# Patient Record
Sex: FEMALE | Race: WHITE | URBAN - METROPOLITAN AREA
[De-identification: names, ages, dates, MRNs, and addresses within clinical notes are randomized per-mention and may not be internally consistent; named-entity substitution may affect disease eponyms.]

---

## 2021-08-28 ENCOUNTER — TELEPHONE ENCOUNTER (OUTPATIENT)
Dept: URBAN - METROPOLITAN AREA CLINIC 13 | Facility: CLINIC | Age: 61
End: 2021-08-28

## 2021-08-29 ENCOUNTER — TELEPHONE ENCOUNTER (OUTPATIENT)
Dept: URBAN - METROPOLITAN AREA CLINIC 13 | Facility: CLINIC | Age: 61
End: 2021-08-29

## 2024-07-29 ENCOUNTER — OFFICE VISIT (OUTPATIENT)
Dept: URBAN - METROPOLITAN AREA CLINIC 86 | Facility: CLINIC | Age: 64
End: 2024-07-29

## 2024-08-07 ENCOUNTER — OFFICE VISIT (OUTPATIENT)
Dept: URBAN - METROPOLITAN AREA CLINIC 86 | Facility: CLINIC | Age: 64
End: 2024-08-07
Payer: COMMERCIAL

## 2024-08-07 VITALS
BODY MASS INDEX: 29.19 KG/M2 | SYSTOLIC BLOOD PRESSURE: 166 MMHG | TEMPERATURE: 96.6 F | WEIGHT: 171 LBS | DIASTOLIC BLOOD PRESSURE: 94 MMHG | HEART RATE: 73 BPM | HEIGHT: 64 IN

## 2024-08-07 DIAGNOSIS — Z71.85 VACCINE COUNSELING: ICD-10-CM

## 2024-08-07 DIAGNOSIS — K76.9 LIVER LESION: ICD-10-CM

## 2024-08-07 PROBLEM — 707724006: Status: ACTIVE | Noted: 2024-08-07

## 2024-08-07 PROBLEM — 443913008: Status: ACTIVE | Noted: 2024-08-07

## 2024-08-07 PROBLEM — 300331000: Status: ACTIVE | Noted: 2024-08-07

## 2024-08-07 PROBLEM — 151271000119102: Status: ACTIVE | Noted: 2024-08-07

## 2024-08-07 PROCEDURE — 99214 OFFICE O/P EST MOD 30 MIN: CPT | Performed by: PHYSICIAN ASSISTANT

## 2024-08-07 RX ORDER — MONTELUKAST SODIUM 10 MG/1
1 TABLET TABLET, FILM COATED ORAL ONCE A DAY
Status: ACTIVE | COMMUNITY

## 2024-08-07 NOTE — HPI-TODAY'S VISIT:
This is a 63-year-old female, referred by Dr. Smith, for evaluation of elevated liver enzymes and positive AMA. A copy of the note will be sent to the referring provider.   PMH includes allergies, asthma, hx hip replacements.  She is listed as taking azelastine 137 mcg, montelukast 10 mg, hydroxyzine 25 mg.  Labs reviewed in July 17 2024 labs with MARIA L positive, iron studies were normal.  Bilirubin 0.5, alkaline phosphatase 442, AST 69, ALT 84.  Immunoglobulin G553 and IgM normal.  .  Ceruloplasmin 43, alpha 1 antitrypsin level 178, ASMA was negative. SHe was able to find labs on shai from 2019 and at that time she had alp 258, ast 35, alt 55 tb 0.5  2020 labs with alp 261, ast 38, alt 39 tb 0.2  2021 alp 335 ast 34 alt 39 0.4  2023 alp 425 ast 40 alt 75  June 2024 labs with alp 502, ast 45, alt 57  CT scan done 9n 8/1/24 showing 1.7 cm enhancing lesion. suspected hemangioma but said to get MRI to be sure  She notees that her liver enzymes have been elevated for some time. She has had this for the past 4-5 yeras. Not sure of the elevation.   She has taken milk thistle. Not currently. She avoids NSAIDS. she said she took marshmellow root She takes a daily MVI and that is all. She used to take calcium.  She takes singulair  hydroxyzine 10 mg  Denies recent illness or illness around the time of the labs and the antibiotics   She has MRI scheduled next week.

## 2024-08-13 ENCOUNTER — TELEPHONE ENCOUNTER (OUTPATIENT)
Dept: URBAN - METROPOLITAN AREA CLINIC 86 | Facility: CLINIC | Age: 64
End: 2024-08-13

## 2024-08-13 LAB
A/G RATIO: 1.5
ABSOLUTE BASOPHILS: 67
ABSOLUTE EOSINOPHILS: 385
ABSOLUTE LYMPHOCYTES: 2227
ABSOLUTE MONOCYTES: 511
ABSOLUTE NEUTROPHILS: 4211
ALBUMIN: 4.6
ALKALINE PHOSPHATASE: 630
ALKALINE PHOSPHATASE: 643
ALPHA-1-ANTITRYPSIN (AAT) PHENOTYPE: (no result)
ALT (SGPT): 100
AST (SGOT): 55
BASOPHILS: 0.9
BILIRUBIN, TOTAL: 0.6
BONE ISOENZYMES: 24
BUN/CREATININE RATIO: 43
BUN: 19
CALCIUM: 10.2
CARBON DIOXIDE, TOTAL: 22
CHLORIDE: 103
CREATININE: 0.44
EGFR: 110
EOSINOPHILS: 5.2
GLOBULIN, TOTAL: 3.1
GLUCOSE: 103
HEMATOCRIT: 42.8
HEMOGLOBIN: 14.5
HEPATITIS A AB, TOTAL: REACTIVE
HEPATITIS B SURFACE AB IMMUNITY, QN: <5
IMMUNOGLOBULIN A: 103
IMMUNOGLOBULIN G: 677
IMMUNOGLOBULIN M: 1069
INR: 0.9
INTESTINAL ISOENZYMES: 0
LIVER ISOENZYMES: 76
LYMPHOCYTES: 30.1
MACROHEPATIC ISOENZYMES: 0
MCH: 31.8
MCHC: 33.9
MCV: 93.9
MONOCYTES: 6.9
MPV: 11
NEUTROPHILS: 56.9
PLACENTAL ISOENZYMES: 0
PLATELET COUNT: 325
POTASSIUM: 4.7
PROTEIN, TOTAL: 7.7
PT: 9.8
RDW: 12.6
RED BLOOD CELL COUNT: 4.56
SODIUM: 140
WHITE BLOOD CELL COUNT: 7.4

## 2024-08-13 NOTE — HPI-TODAY'S VISIT:
Dear Wandy Foster,   The recent 8/7/24 labs were sent to me.  The immunoglobulin testing showed that the immunoglobulin M was elevated at 1069, IgG 677 this is normal.  You are not immune to hepatitis B and I recommend discussing that vaccine with your primary care.  Alpha 1 antitrypsin phenotype is MM and this is normal.  The alkaline phosphatase is elevated at 643, AST 55, , bilirubin 0.6.  Goal for the AST and ALT is less than 35.  The white blood cell 7.4, hemoglobin 14.5, MCV 93 and platelets 325.  INR 0.9.  You are immune to hepatitis A.  When I fractionated the alkaline phosphatase that showed that it is mainly coming from the liver and therefore given the labs it does appear that your immune system is active and we  need to see the MRI and pending that likely a biopsy. Have you done the MRI? Please let me know and I will get my medical assistant to get the report.  Sarah Cruz PA-C

## 2024-08-14 ENCOUNTER — TELEPHONE ENCOUNTER (OUTPATIENT)
Dept: URBAN - METROPOLITAN AREA CLINIC 86 | Facility: CLINIC | Age: 64
End: 2024-08-14

## 2024-08-14 ENCOUNTER — WEB ENCOUNTER (OUTPATIENT)
Dept: URBAN - METROPOLITAN AREA CLINIC 86 | Facility: CLINIC | Age: 64
End: 2024-08-14

## 2024-08-14 NOTE — HPI-TODAY'S VISIT:
Dear Wandy Foster,   The 8/13/24 MRI was sent to me.  The liver without fat or iron.  They saw a small 1.9 x 2 cm area that is consistent with a hemangioma in the left hepatic lobe. These are benign.  They did not see any gallstones they saw mild intrahepatic biliary ductal dilatation and mild dilation of the common bile duct measuring up to 12 mm and I did not see any stone.  Spleen unremarkable.  They saw some several lymph nodes that were enlarged.  They noted that the pancreatic duct was mildly dilated at 4 mm.  Several lymph nodes were mildly enlarged for example gastrohepatic nodes measuring 3.2 x 1.6 cm and periportal nodes 3.5 x 3 cm and aortocaval nodes 1.8 x 1.2 cm.  Overall they saw abnormal retroperitoneal lymphadenopathy they noted that malignancy like lymphoma or metastatic disease could not be excluded they also noted mild intrahepatic and extrahepatic biliary ductal dilatation and mildly dilated pancreatic duct they did not see any stones.  They recommend correlation with ERCP and labs.  May be good to repeat the labs once you are feeling better to see if they are improved. Curious if your illness had an affect on the lymph nodes? Eitherway we will likely need the ERCP. I will go ahead and refer to our team that performs these.  Sarah Cruz PA-C

## 2024-08-15 ENCOUNTER — TELEPHONE ENCOUNTER (OUTPATIENT)
Dept: URBAN - METROPOLITAN AREA CLINIC 86 | Facility: CLINIC | Age: 64
End: 2024-08-15

## 2024-08-15 ENCOUNTER — WEB ENCOUNTER (OUTPATIENT)
Dept: URBAN - METROPOLITAN AREA CLINIC 86 | Facility: CLINIC | Age: 64
End: 2024-08-15

## 2024-08-21 ENCOUNTER — OFFICE VISIT (OUTPATIENT)
Dept: URBAN - METROPOLITAN AREA TELEHEALTH 2 | Facility: TELEHEALTH | Age: 64
End: 2024-08-21
Payer: COMMERCIAL

## 2024-08-21 ENCOUNTER — LAB OUTSIDE AN ENCOUNTER (OUTPATIENT)
Dept: URBAN - METROPOLITAN AREA TELEHEALTH 2 | Facility: TELEHEALTH | Age: 64
End: 2024-08-21

## 2024-08-21 DIAGNOSIS — K83.8 COMMON BILE DUCT DILATATION: ICD-10-CM

## 2024-08-21 DIAGNOSIS — K76.9 LIVER LESION: ICD-10-CM

## 2024-08-21 DIAGNOSIS — R74.8 ELEVATED LIVER ENZYMES: ICD-10-CM

## 2024-08-21 DIAGNOSIS — R76.9 ABNORMAL IMMUNOLOGICAL FINDING IN SERUM: ICD-10-CM

## 2024-08-21 PROBLEM — 123608004: Status: ACTIVE | Noted: 2024-08-21

## 2024-08-21 PROCEDURE — 99214 OFFICE O/P EST MOD 30 MIN: CPT | Performed by: PHYSICIAN ASSISTANT

## 2024-08-21 RX ORDER — MONTELUKAST SODIUM 10 MG/1
1 TABLET TABLET, FILM COATED ORAL ONCE A DAY
Status: ACTIVE | COMMUNITY

## 2024-08-21 NOTE — HPI-TODAY'S VISIT:
This is a 63-year-old female, referred by Dr. Smith, for evaluation of elevated liver enzymes and positive AMA. A copy of the note will be sent to the referring provider.    Dear Wandy Foster,   The recent 8/7/24 labs were sent to me.  The immunoglobulin testing showed that the immunoglobulin M was elevated at 1069, IgG 677 this is normal.  You are not immune to hepatitis B and I recommend discussing that vaccine with your primary care.  Alpha 1 antitrypsin phenotype is MM and this is normal.  The alkaline phosphatase is elevated at 643, AST 55, , bilirubin 0.6.  Goal for the AST and ALT is less than 35.  The white blood cell 7.4, hemoglobin 14.5, MCV 93 and platelets 325.  INR 0.9.  You are immune to hepatitis A.  When I fractionated the alkaline phosphatase that showed that it is mainly coming from the liver and therefore given the labs it does appear that your immune system is active and we  need to see the MRI and pending that likely a biopsy. Have you done the MRI? Please let me know and I will get my medical assistant to get the report.  Sarah Cruz PA-C  Dear Wandy Foster,   The 8/13/24 MRI was sent to me.  The liver without fat or iron.  They saw a small 1.9 x 2 cm area that is consistent with a hemangioma in the left hepatic lobe. These are benign.  They did not see any gallstones they saw mild intrahepatic biliary ductal dilatation and mild dilation of the common bile duct measuring up to 12 mm and I did not see any stone.  Spleen unremarkable.  They saw some several lymph nodes that were enlarged.  They noted that the pancreatic duct was mildly dilated at 4 mm.  Several lymph nodes were mildly enlarged for example gastrohepatic nodes measuring 3.2 x 1.6 cm and periportal nodes 3.5 x 3 cm and aortocaval nodes 1.8 x 1.2 cm.  Overall they saw abnormal retroperitoneal lymphadenopathy they noted that malignancy like lymphoma or metastatic disease could not be excluded they also noted mild intrahepatic and extrahepatic biliary ductal dilatation and mildly dilated pancreatic duct they did not see any stones.  They recommend correlation with ERCP and labs.  May be good to repeat the labs once you are feeling better to see if they are improved. Curious if your illness had an affect on the lymph nodes? Eitherway we will likely need the ERCP. I will go ahead and refer to our team that performs these.  Sarah Cruz PA-C    she was really sick and saw pcp had rocephin and given zpack but feeling better this was right after she saw me and after mri she is scheduled for ercp consult still think given 5 + year of elevation and the labs still being abmormal need the bx. ERCP is not going to give us the diagnosis  recap  PMH includes allergies, asthma, hx hip replacements.  She is listed as taking azelastine 137 mcg, montelukast 10 mg, hydroxyzine 25 mg.  Labs reviewed in July 17 2024 labs with MARIA L positive, iron studies were normal.  Bilirubin 0.5, alkaline phosphatase 442, AST 69, ALT 84.  Immunoglobulin G553 and IgM normal.  .  Ceruloplasmin 43, alpha 1 antitrypsin level 178, ASMA was negative. SHe was able to find labs on shai from 2019 and at that time she had alp 258, ast 35, alt 55 tb 0.5  2020 labs with alp 261, ast 38, alt 39 tb 0.2  2021 alp 335 ast 34 alt 39 0.4  2023 alp 425 ast 40 alt 75  June 2024 labs with alp 502, ast 45, alt 57  CT scan done 9n 8/1/24 showing 1.7 cm enhancing lesion. suspected hemangioma but said to get MRI to be sure  She notees that her liver enzymes have been elevated for some time. She has had this for the past 4-5 yeras. Not sure of the elevation.   She has taken milk thistle. Not currently. She avoids NSAIDS. she said she took marshmellow root She takes a daily MVI and that is all. She used to take calcium.  She takes singulair  hydroxyzine 10 mg  Denies recent illness or illness around the time of the labs and the antibiotics   She has MRI scheduled next week.

## 2024-08-23 ENCOUNTER — DASHBOARD ENCOUNTERS (OUTPATIENT)
Age: 64
End: 2024-08-23

## 2024-08-26 ENCOUNTER — TELEPHONE ENCOUNTER (OUTPATIENT)
Dept: URBAN - METROPOLITAN AREA CLINIC 92 | Facility: CLINIC | Age: 64
End: 2024-08-26

## 2024-08-27 ENCOUNTER — OFFICE VISIT (OUTPATIENT)
Dept: URBAN - METROPOLITAN AREA CLINIC 92 | Facility: CLINIC | Age: 64
End: 2024-08-27

## 2024-08-27 RX ORDER — MONTELUKAST SODIUM 10 MG/1
1 TABLET TABLET, FILM COATED ORAL ONCE A DAY
Status: ACTIVE | COMMUNITY

## 2024-08-28 ENCOUNTER — WEB ENCOUNTER (OUTPATIENT)
Dept: URBAN - METROPOLITAN AREA CLINIC 86 | Facility: CLINIC | Age: 64
End: 2024-08-28

## 2024-08-30 ENCOUNTER — WEB ENCOUNTER (OUTPATIENT)
Dept: URBAN - METROPOLITAN AREA CLINIC 86 | Facility: CLINIC | Age: 64
End: 2024-08-30

## 2024-09-02 ENCOUNTER — WEB ENCOUNTER (OUTPATIENT)
Dept: URBAN - METROPOLITAN AREA CLINIC 86 | Facility: CLINIC | Age: 64
End: 2024-09-02

## 2024-09-04 ENCOUNTER — WEB ENCOUNTER (OUTPATIENT)
Dept: URBAN - METROPOLITAN AREA CLINIC 86 | Facility: CLINIC | Age: 64
End: 2024-09-04

## 2024-09-11 ENCOUNTER — OFFICE VISIT (OUTPATIENT)
Dept: URBAN - METROPOLITAN AREA TELEHEALTH 2 | Facility: TELEHEALTH | Age: 64
End: 2024-09-11
Payer: COMMERCIAL

## 2024-09-11 ENCOUNTER — TELEPHONE ENCOUNTER (OUTPATIENT)
Dept: URBAN - METROPOLITAN AREA CLINIC 92 | Facility: CLINIC | Age: 64
End: 2024-09-11

## 2024-09-11 VITALS — HEIGHT: 64 IN | WEIGHT: 180 LBS | BODY MASS INDEX: 30.73 KG/M2

## 2024-09-11 DIAGNOSIS — K83.8 COMMON BILE DUCT DILATATION: ICD-10-CM

## 2024-09-11 PROCEDURE — 99213 OFFICE O/P EST LOW 20 MIN: CPT

## 2024-09-11 RX ORDER — MONTELUKAST SODIUM 10 MG/1
1 TABLET TABLET, FILM COATED ORAL ONCE A DAY
Status: ACTIVE | COMMUNITY

## 2024-09-11 NOTE — HPI-TODAY'S VISIT:
63-year-old female presents today for common bile duct dilation.  She sees liver center.  She had an MRI on 8- which demonstrated abnormal upper abdominal and retroperitoneal lymphadenopathy malignancy such as lymphoma or metastatic disease not excluded, mild intrahepatic and extrahepatic biliary ductal dilation and mildly dilated pancreatic ducts.  No choledocholithiasis or obstructing mass identified recommend correlation with liver function test and consider ERCP, benign hemangioma in the left hepatic lobe.  Her AMA and MARIA L was positive and they are ruling out PBC going to obtain a liver biopsy.  Labs 8- demonstrated , AST 55,  ALP isoenzymes and liver were elevated at 76, INR 0.9, platelets 325.  She has no abd pain,n/v/f/c. She has normal BM daily. Last colon was 9 years ago. She is due next year for repeat colon. She has no fam h/o gi cancer or conditions.

## 2024-09-16 ENCOUNTER — WEB ENCOUNTER (OUTPATIENT)
Dept: URBAN - METROPOLITAN AREA CLINIC 92 | Facility: CLINIC | Age: 64
End: 2024-09-16

## 2024-09-23 ENCOUNTER — OFFICE VISIT (OUTPATIENT)
Dept: URBAN - METROPOLITAN AREA CLINIC 92 | Facility: CLINIC | Age: 64
End: 2024-09-23

## 2024-09-23 ENCOUNTER — OFFICE VISIT (OUTPATIENT)
Dept: URBAN - METROPOLITAN AREA CLINIC 86 | Facility: CLINIC | Age: 64
End: 2024-09-23

## 2024-10-02 ENCOUNTER — LAB OUTSIDE AN ENCOUNTER (OUTPATIENT)
Dept: URBAN - METROPOLITAN AREA TELEHEALTH 2 | Facility: TELEHEALTH | Age: 64
End: 2024-10-02

## 2024-10-15 ENCOUNTER — OFFICE VISIT (OUTPATIENT)
Dept: URBAN - METROPOLITAN AREA MEDICAL CENTER 28 | Facility: MEDICAL CENTER | Age: 64
End: 2024-10-15

## 2024-10-21 ENCOUNTER — OFFICE VISIT (OUTPATIENT)
Dept: URBAN - METROPOLITAN AREA MEDICAL CENTER 28 | Facility: MEDICAL CENTER | Age: 64
End: 2024-10-21
Payer: COMMERCIAL

## 2024-10-21 DIAGNOSIS — R93.2 ABNORMAL BILIARY X-RAY: ICD-10-CM

## 2024-10-21 DIAGNOSIS — K83.8 OTHER SPECIFIED DISEASES OF BILIARY TRACT: ICD-10-CM

## 2024-10-21 DIAGNOSIS — K86.89 OTHER SPECIFIED DISEASES OF PANCREAS: ICD-10-CM

## 2024-10-21 DIAGNOSIS — K76.89 OTHER SPECIFIED DISEASES OF LIVER: ICD-10-CM

## 2024-10-21 DIAGNOSIS — R59.0 LOCALIZED ENLARGED LYMPH NODES: ICD-10-CM

## 2024-10-21 PROCEDURE — 43259 EGD US EXAM DUODENUM/JEJUNUM: CPT | Performed by: STUDENT IN AN ORGANIZED HEALTH CARE EDUCATION/TRAINING PROGRAM

## 2024-10-21 PROCEDURE — 43239 EGD BIOPSY SINGLE/MULTIPLE: CPT | Performed by: STUDENT IN AN ORGANIZED HEALTH CARE EDUCATION/TRAINING PROGRAM

## 2024-10-21 RX ORDER — MONTELUKAST SODIUM 10 MG/1
1 TABLET TABLET, FILM COATED ORAL ONCE A DAY
Status: ACTIVE | COMMUNITY

## 2024-10-22 ENCOUNTER — WEB ENCOUNTER (OUTPATIENT)
Dept: URBAN - METROPOLITAN AREA CLINIC 92 | Facility: CLINIC | Age: 64
End: 2024-10-22

## 2024-11-01 ENCOUNTER — WEB ENCOUNTER (OUTPATIENT)
Dept: URBAN - METROPOLITAN AREA CLINIC 92 | Facility: CLINIC | Age: 64
End: 2024-11-01

## 2024-11-04 ENCOUNTER — WEB ENCOUNTER (OUTPATIENT)
Dept: URBAN - METROPOLITAN AREA CLINIC 92 | Facility: CLINIC | Age: 64
End: 2024-11-04

## 2024-11-05 ENCOUNTER — WEB ENCOUNTER (OUTPATIENT)
Dept: URBAN - METROPOLITAN AREA CLINIC 92 | Facility: CLINIC | Age: 64
End: 2024-11-05

## 2024-11-06 ENCOUNTER — LAB OUTSIDE AN ENCOUNTER (OUTPATIENT)
Dept: URBAN - METROPOLITAN AREA CLINIC 86 | Facility: CLINIC | Age: 64
End: 2024-11-06

## 2024-11-06 ENCOUNTER — WEB ENCOUNTER (OUTPATIENT)
Dept: URBAN - METROPOLITAN AREA CLINIC 92 | Facility: CLINIC | Age: 64
End: 2024-11-06

## 2024-11-06 ENCOUNTER — TELEPHONE ENCOUNTER (OUTPATIENT)
Dept: URBAN - METROPOLITAN AREA CLINIC 86 | Facility: CLINIC | Age: 64
End: 2024-11-06

## 2024-11-07 ENCOUNTER — WEB ENCOUNTER (OUTPATIENT)
Dept: URBAN - METROPOLITAN AREA CLINIC 86 | Facility: CLINIC | Age: 64
End: 2024-11-07

## 2024-11-07 ENCOUNTER — WEB ENCOUNTER (OUTPATIENT)
Dept: URBAN - METROPOLITAN AREA CLINIC 92 | Facility: CLINIC | Age: 64
End: 2024-11-07

## 2024-11-08 ENCOUNTER — WEB ENCOUNTER (OUTPATIENT)
Dept: URBAN - METROPOLITAN AREA CLINIC 86 | Facility: CLINIC | Age: 64
End: 2024-11-08

## 2024-11-18 ENCOUNTER — TELEPHONE ENCOUNTER (OUTPATIENT)
Dept: URBAN - METROPOLITAN AREA CLINIC 86 | Facility: CLINIC | Age: 64
End: 2024-11-18

## 2024-11-26 ENCOUNTER — WEB ENCOUNTER (OUTPATIENT)
Dept: URBAN - METROPOLITAN AREA CLINIC 86 | Facility: CLINIC | Age: 64
End: 2024-11-26

## 2024-11-26 ENCOUNTER — OFFICE VISIT (OUTPATIENT)
Dept: URBAN - METROPOLITAN AREA MEDICAL CENTER 28 | Facility: MEDICAL CENTER | Age: 64
End: 2024-11-26

## 2024-12-05 ENCOUNTER — TELEPHONE ENCOUNTER (OUTPATIENT)
Dept: URBAN - METROPOLITAN AREA CLINIC 86 | Facility: CLINIC | Age: 64
End: 2024-12-05

## 2024-12-05 PROBLEM — 31712002: Status: ACTIVE | Noted: 2024-12-05

## 2024-12-05 NOTE — HPI-TODAY'S VISIT:
Dear Wandy Foster, November 20 CT-guided biopsy was finally released to us. The pathologist apparently had your case also discussed with another specialist report pre releasing this.  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  They found some periportal and patchy bridging fibrosis which would be suspicious for stage III liver fibrosis being present.   In addition they mentioned that in some portal tracts infiltrate was centered around the bile ducts which contain intraepithelial lymphocytes and they show clear evidence of biliary epithelial damage.  A rare well-formed portal granuloma was noted and one portal tract showed concentric periductal inflammation.   There was marked ductular proliferation seen and moderate interface hepatitis was seen. The lobules show foci of lymphocytic inflammatory infiltrate but no steatosis.   Immunohistochemical stains were done confirming the marked bile duct proliferation and loss of bile ducts and some portal tracts.  Biliary metaplasia of hepatocytes was also seen.   There was some focal copper deposition seen in the periportal hepatocytes.   They commented that they have reviewed the additional lab records that we sent and that they saw the alk  phosphatase was high at 489 with an AMA of 640 and the MARIA L of 80 and they felt that those were also consistent with a diagnosis of primary biliary cholangitis.   The case was signed out by Dr. Trung Ellis and reviewed also with another liver specialist at [place] .   We need to have you come in so that we can review your current labs, and discuss the therapy of this.   I do not see that you have a pending appointment as I believe this biopsy was delayed.   In preparation for your visit I will ask that you do these updated labs for us so that when Sarah sees you or I would have updated labs are new.  I will ask Cande to call you and get the labs and appt set up.   Dr. Chisholm

## 2024-12-06 ENCOUNTER — WEB ENCOUNTER (OUTPATIENT)
Dept: URBAN - METROPOLITAN AREA CLINIC 86 | Facility: CLINIC | Age: 64
End: 2024-12-06

## 2024-12-09 ENCOUNTER — WEB ENCOUNTER (OUTPATIENT)
Dept: URBAN - METROPOLITAN AREA CLINIC 86 | Facility: CLINIC | Age: 64
End: 2024-12-09

## 2024-12-09 ENCOUNTER — LAB OUTSIDE AN ENCOUNTER (OUTPATIENT)
Dept: URBAN - METROPOLITAN AREA TELEHEALTH 2 | Facility: TELEHEALTH | Age: 64
End: 2024-12-09

## 2024-12-09 ENCOUNTER — OFFICE VISIT (OUTPATIENT)
Dept: URBAN - METROPOLITAN AREA TELEHEALTH 2 | Facility: TELEHEALTH | Age: 64
End: 2024-12-09
Payer: COMMERCIAL

## 2024-12-09 VITALS — WEIGHT: 174 LBS | BODY MASS INDEX: 29.71 KG/M2 | HEIGHT: 64 IN

## 2024-12-09 DIAGNOSIS — K83.8 COMMON BILE DUCT DILATATION: ICD-10-CM

## 2024-12-09 DIAGNOSIS — K74.3 PRIMARY BILIARY CHOLANGITIS: ICD-10-CM

## 2024-12-09 DIAGNOSIS — Z71.85 VACCINE COUNSELING: ICD-10-CM

## 2024-12-09 DIAGNOSIS — R74.8 ELEVATED LIVER ENZYMES: ICD-10-CM

## 2024-12-09 DIAGNOSIS — R76.9 ABNORMAL IMMUNOLOGICAL FINDING IN SERUM: ICD-10-CM

## 2024-12-09 DIAGNOSIS — K76.9 LIVER LESION: ICD-10-CM

## 2024-12-09 PROCEDURE — 99214 OFFICE O/P EST MOD 30 MIN: CPT | Performed by: PHYSICIAN ASSISTANT

## 2024-12-09 RX ORDER — URSODIOL 300 MG/1
1 CAPSULE CAPSULE ORAL
Qty: 90 | Refills: 2 | OUTPATIENT
Start: 2024-12-09

## 2024-12-09 RX ORDER — MONTELUKAST SODIUM 10 MG/1
1 TABLET TABLET, FILM COATED ORAL ONCE A DAY
Status: ACTIVE | COMMUNITY

## 2024-12-09 NOTE — HPI-TODAY'S VISIT:
This is a 63-year-old female, referred by Dr. Smith, for evaluation of elevated liver enzymes and positive AMA. A copy of the note will be sent to the referring provider.    12/9/24 Dear Wandy Foster,  November 20 CT-guided biopsy was finally released to us. The pathologist apparently had your case also discussed with another specialist report pre releasing this.   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 They found some periportal and patchy bridging fibrosis which would be suspicious for stage III liver fibrosis being present.    In addition they mentioned that in some portal tracts infiltrate was centered around the bile ducts which contain intraepithelial lymphocytes and they show clear evidence of biliary epithelial damage. A rare well-formed portal granuloma was noted and one portal tract showed concentric periductal inflammation.    There was marked ductular proliferation seen and moderate interface hepatitis was seen.  The lobules show foci of lymphocytic inflammatory infiltrate but no steatosis.    Immunohistochemical stains were done confirming the marked bile duct proliferation and loss of bile ducts and some portal tracts. Biliary metaplasia of hepatocytes was also seen.    There was some focal copper deposition seen in the periportal hepatocytes.    They commented that they have reviewed the additional lab records that we sent and that they saw the alk  phosphatase was high at 489 with an AMA of 640 and the MARIA L of 80 and they felt that those were also consistent with a diagnosis of primary biliary cholangitis.    The case was signed out by Dr. Trung Ellis and reviewed also with another liver specialist at [place] .    We need to have you come in so that we can review your current labs, and discuss the therapy of this.    I do not see that you have a pending appointment as I believe this biopsy was delayed.    In preparation for your visit I will ask that you do these updated labs for us so that when Sarah sees you or I would have updated labs are new.   I will ask Cande to call you and get the labs and appt set up.   Her recent labs were on November 20 and the bilirubin 0.4, alkaline phosphatase 49, AST 48 and ALT 62.  INR 0.9.  Platelets 258, hemoglobin 13.1, white blood cell 7.8, red blood cells 4.36  reviewed the above and start rusodiol needs dexa and cholesterol checks  check mri again    recap Dear Wandy Foster,   The recent 8/7/24 labs were sent to me.  The immunoglobulin testing showed that the immunoglobulin M was elevated at 1069, IgG 677 this is normal.  You are not immune to hepatitis B and I recommend discussing that vaccine with your primary care.  Alpha 1 antitrypsin phenotype is MM and this is normal.  The alkaline phosphatase is elevated at 643, AST 55, , bilirubin 0.6.  Goal for the AST and ALT is less than 35.  The white blood cell 7.4, hemoglobin 14.5, MCV 93 and platelets 325.  INR 0.9.  You are immune to hepatitis A.  When I fractionated the alkaline phosphatase that showed that it is mainly coming from the liver and therefore given the labs it does appear that your immune system is active and we  need to see the MRI and pending that likely a biopsy. Have you done the MRI? Please let me know and I will get my medical assistant to get the report.  Sarah Cruz PA-C  Dear Wandy Foster,   The 8/13/24 MRI was sent to me.  The liver without fat or iron.  They saw a small 1.9 x 2 cm area that is consistent with a hemangioma in the left hepatic lobe. These are benign.  They did not see any gallstones they saw mild intrahepatic biliary ductal dilatation and mild dilation of the common bile duct measuring up to 12 mm and I did not see any stone.  Spleen unremarkable.  They saw some several lymph nodes that were enlarged.  They noted that the pancreatic duct was mildly dilated at 4 mm.  Several lymph nodes were mildly enlarged for example gastrohepatic nodes measuring 3.2 x 1.6 cm and periportal nodes 3.5 x 3 cm and aortocaval nodes 1.8 x 1.2 cm.  Overall they saw abnormal retroperitoneal lymphadenopathy they noted that malignancy like lymphoma or metastatic disease could not be excluded they also noted mild intrahepatic and extrahepatic biliary ductal dilatation and mildly dilated pancreatic duct they did not see any stones.  They recommend correlation with ERCP and labs.  May be good to repeat the labs once you are feeling better to see if they are improved. Curious if your illness had an affect on the lymph nodes? Eitherway we will likely need the ERCP. I will go ahead and refer to our team that performs these.  Sarah Cruz PA-C    she was really sick and saw pcp had rocephin and given zpack but feeling better this was right after she saw me and after mri she is scheduled for ercp consult still think given 5 + year of elevation and the labs still being abmormal need the bx. ERCP is not going to give us the diagnosis  recap  PMH includes allergies, asthma, hx hip replacements.  She is listed as taking azelastine 137 mcg, montelukast 10 mg, hydroxyzine 25 mg.  Labs reviewed in July 17 2024 labs with MARIA L positive, iron studies were normal.  Bilirubin 0.5, alkaline phosphatase 442, AST 69, ALT 84.  Immunoglobulin G553 and IgM normal.  .  Ceruloplasmin 43, alpha 1 antitrypsin level 178, ASMA was negative. SHe was able to find labs on shai from 2019 and at that time she had alp 258, ast 35, alt 55 tb 0.5  2020 labs with alp 261, ast 38, alt 39 tb 0.2  2021 alp 335 ast 34 alt 39 0.4  2023 alp 425 ast 40 alt 75  June 2024 labs with alp 502, ast 45, alt 57  CT scan done 9n 8/1/24 showing 1.7 cm enhancing lesion. suspected hemangioma but said to get MRI to be sure  She notees that her liver enzymes have been elevated for some time. She has had this for the past 4-5 yeras. Not sure of the elevation.   She has taken milk thistle. Not currently. She avoids NSAIDS. she said she took marshmellow root She takes a daily MVI and that is all. She used to take calcium.  She takes singulair  hydroxyzine 10 mg  Denies recent illness or illness around the time of the labs and the antibiotics   She has MRI scheduled next week.

## 2024-12-10 ENCOUNTER — WEB ENCOUNTER (OUTPATIENT)
Dept: URBAN - METROPOLITAN AREA CLINIC 86 | Facility: CLINIC | Age: 64
End: 2024-12-10

## 2024-12-15 ENCOUNTER — WEB ENCOUNTER (OUTPATIENT)
Dept: URBAN - METROPOLITAN AREA CLINIC 86 | Facility: CLINIC | Age: 64
End: 2024-12-15

## 2024-12-17 ENCOUNTER — LAB OUTSIDE AN ENCOUNTER (OUTPATIENT)
Dept: URBAN - METROPOLITAN AREA CLINIC 18 | Facility: CLINIC | Age: 64
End: 2024-12-17

## 2024-12-17 ENCOUNTER — TELEPHONE ENCOUNTER (OUTPATIENT)
Dept: URBAN - METROPOLITAN AREA CLINIC 18 | Facility: CLINIC | Age: 64
End: 2024-12-17

## 2024-12-18 ENCOUNTER — WEB ENCOUNTER (OUTPATIENT)
Dept: URBAN - METROPOLITAN AREA CLINIC 86 | Facility: CLINIC | Age: 64
End: 2024-12-18

## 2024-12-20 ENCOUNTER — WEB ENCOUNTER (OUTPATIENT)
Dept: URBAN - METROPOLITAN AREA CLINIC 86 | Facility: CLINIC | Age: 64
End: 2024-12-20

## 2024-12-23 ENCOUNTER — TELEPHONE ENCOUNTER (OUTPATIENT)
Dept: URBAN - METROPOLITAN AREA CLINIC 86 | Facility: CLINIC | Age: 64
End: 2024-12-23

## 2024-12-23 NOTE — HPI-TODAY'S VISIT:
Wandy Foster,  We were sent more labs from the 19th and IgG was 611 and the IgM was slightly elevated at 811. We will continue to monitor.  Sarah Cruz PA-C

## 2024-12-28 ENCOUNTER — WEB ENCOUNTER (OUTPATIENT)
Dept: URBAN - METROPOLITAN AREA CLINIC 86 | Facility: CLINIC | Age: 64
End: 2024-12-28

## 2025-01-02 ENCOUNTER — LAB OUTSIDE AN ENCOUNTER (OUTPATIENT)
Dept: URBAN - METROPOLITAN AREA CLINIC 86 | Facility: CLINIC | Age: 65
End: 2025-01-02

## 2025-01-06 ENCOUNTER — LAB OUTSIDE AN ENCOUNTER (OUTPATIENT)
Dept: URBAN - METROPOLITAN AREA TELEHEALTH 2 | Facility: TELEHEALTH | Age: 65
End: 2025-01-06

## 2025-01-07 LAB
A/G RATIO: 1.6
ABSOLUTE BASOPHILS: 79
ABSOLUTE EOSINOPHILS: 506
ABSOLUTE LYMPHOCYTES: 2591
ABSOLUTE MONOCYTES: 616
ABSOLUTE NEUTROPHILS: 4108
ALBUMIN: 4.2
ALKALINE PHOSPHATASE: 335
ALT (SGPT): 28
AST (SGOT): 26
BASOPHILS: 1
BILIRUBIN, TOTAL: 0.5
BUN/CREATININE RATIO: (no result)
BUN: 18
CALCIUM: 9.1
CARBON DIOXIDE, TOTAL: 27
CHLORIDE: 101
CREATININE: 0.54
EGFR: 104
EOSINOPHILS: 6.4
GLOBULIN, TOTAL: 2.7
GLUCOSE: 87
HEMATOCRIT: 39.6
HEMOGLOBIN: 13.4
IMMUNOGLOBULIN G, QN, SERUM: 683
IMMUNOGLOBULIN M: 845
LYMPHOCYTES: 32.8
MCH: 30.2
MCHC: 33.8
MCV: 89.4
MONOCYTES: 7.8
MPV: 11.9
NEUTROPHILS: 52
PLATELET COUNT: 205
POTASSIUM: 4.1
PROTEIN, TOTAL: 6.9
RDW: 12.4
RED BLOOD CELL COUNT: 4.43
SODIUM: 139
WHITE BLOOD CELL COUNT: 7.9

## 2025-01-08 ENCOUNTER — TELEPHONE ENCOUNTER (OUTPATIENT)
Dept: URBAN - METROPOLITAN AREA CLINIC 86 | Facility: CLINIC | Age: 65
End: 2025-01-08

## 2025-01-08 ENCOUNTER — WEB ENCOUNTER (OUTPATIENT)
Dept: URBAN - METROPOLITAN AREA CLINIC 86 | Facility: CLINIC | Age: 65
End: 2025-01-08

## 2025-01-11 ENCOUNTER — WEB ENCOUNTER (OUTPATIENT)
Dept: URBAN - METROPOLITAN AREA CLINIC 86 | Facility: CLINIC | Age: 65
End: 2025-01-11

## 2025-01-12 ENCOUNTER — WEB ENCOUNTER (OUTPATIENT)
Dept: URBAN - METROPOLITAN AREA CLINIC 86 | Facility: CLINIC | Age: 65
End: 2025-01-12

## 2025-01-14 ENCOUNTER — WEB ENCOUNTER (OUTPATIENT)
Dept: URBAN - METROPOLITAN AREA CLINIC 86 | Facility: CLINIC | Age: 65
End: 2025-01-14

## 2025-01-27 ENCOUNTER — WEB ENCOUNTER (OUTPATIENT)
Dept: URBAN - METROPOLITAN AREA CLINIC 86 | Facility: CLINIC | Age: 65
End: 2025-01-27

## 2025-01-28 ENCOUNTER — WEB ENCOUNTER (OUTPATIENT)
Dept: URBAN - METROPOLITAN AREA CLINIC 86 | Facility: CLINIC | Age: 65
End: 2025-01-28

## 2025-02-04 ENCOUNTER — TELEPHONE ENCOUNTER (OUTPATIENT)
Dept: URBAN - METROPOLITAN AREA CLINIC 86 | Facility: CLINIC | Age: 65
End: 2025-02-04

## 2025-02-04 ENCOUNTER — WEB ENCOUNTER (OUTPATIENT)
Dept: URBAN - METROPOLITAN AREA CLINIC 86 | Facility: CLINIC | Age: 65
End: 2025-02-04

## 2025-02-04 NOTE — HPI-TODAY'S VISIT:
Dear Wandy Foster, Recent MRI that you did was sent to me.  The abdomen shows a liver that has the hemangioma that is stable.  They felt that the spleen and pancreas are normal.  They did not see any gallstones.  They noted that the lymph nodes that you had previously were stable.  The kidneys appear normal.  The adrenal glands normal.  They did not see any fluid in the abdomen.  The hepatic vasculature appeared patent.  The common bile duct appeared dilated to about 11 mm and I believe that was 12 prior.  They noted some tapering in the size of the distal common bile duct but no filling defects along the course of the bile duct or pancreatic duct.  They did not see any branching abnormality.  Overall the impression is that the abdominal lymphadenopathy was stable and they noted the common bile duct was distended but they did not think there was a stone.  This is somewhat similar to the last report that we had and you already had the EUS.  They are recommending an ERCP again and I have reached out to our team that you saw before and is see if they think this is necessary.  Sarah Cruz PA-C

## 2025-02-06 ENCOUNTER — WEB ENCOUNTER (OUTPATIENT)
Dept: URBAN - METROPOLITAN AREA CLINIC 86 | Facility: CLINIC | Age: 65
End: 2025-02-06

## 2025-02-09 ENCOUNTER — LAB OUTSIDE AN ENCOUNTER (OUTPATIENT)
Dept: URBAN - METROPOLITAN AREA TELEHEALTH 2 | Facility: TELEHEALTH | Age: 65
End: 2025-02-09

## 2025-02-12 LAB
A/G RATIO: 1.7
ALBUMIN: 4.4
ALKALINE PHOSPHATASE: 262
ALT (SGPT): 27
AST (SGOT): 25
BILIRUBIN, TOTAL: 0.4
BUN/CREATININE RATIO: (no result)
BUN: 23
CALCIUM: 9.3
CARBON DIOXIDE, TOTAL: 26
CHLORIDE: 102
CREATININE: 0.58
EGFR: 102
GLOBULIN, TOTAL: 2.6
GLUCOSE: 87
POTASSIUM: 4.4
PROTEIN, TOTAL: 7
SODIUM: 137

## 2025-02-13 ENCOUNTER — WEB ENCOUNTER (OUTPATIENT)
Dept: URBAN - METROPOLITAN AREA CLINIC 86 | Facility: CLINIC | Age: 65
End: 2025-02-13

## 2025-03-06 ENCOUNTER — ERX REFILL RESPONSE (OUTPATIENT)
Dept: URBAN - METROPOLITAN AREA CLINIC 92 | Facility: CLINIC | Age: 65
End: 2025-03-06

## 2025-03-06 RX ORDER — URSODIOL 300 MG/1
TAKE 1 BY MOUTH 3 TIMES A DAY CAPSULE ORAL
Qty: 90 CAPSULE | Refills: 2 | OUTPATIENT

## 2025-03-06 RX ORDER — URSODIOL 300 MG/1
1 CAPSULE CAPSULE ORAL
Qty: 90 | Refills: 2 | OUTPATIENT

## 2025-03-09 ENCOUNTER — LAB OUTSIDE AN ENCOUNTER (OUTPATIENT)
Dept: URBAN - METROPOLITAN AREA TELEHEALTH 2 | Facility: TELEHEALTH | Age: 65
End: 2025-03-09

## 2025-03-11 LAB
A/G RATIO: 1.6
ABSOLUTE BASOPHILS: 61
ABSOLUTE EOSINOPHILS: 327
ABSOLUTE LYMPHOCYTES: 2782
ABSOLUTE MONOCYTES: 669
ABSOLUTE NEUTROPHILS: 3762
ALBUMIN: 4.2
ALKALINE PHOSPHATASE: 193
ALT (SGPT): 23
AST (SGOT): 24
BASOPHILS: 0.8
BILIRUBIN, TOTAL: 0.3
BUN/CREATININE RATIO: (no result)
BUN: 24
CALCIUM: 9.2
CARBON DIOXIDE, TOTAL: 27
CHLORIDE: 104
CREATININE: 0.56
EGFR: 103
EOSINOPHILS: 4.3
GLOBULIN, TOTAL: 2.6
GLUCOSE: 96
HEMATOCRIT: 39.3
HEMOGLOBIN: 12.8
IMMUNOGLOBULIN G, QN, SERUM: 698
IMMUNOGLOBULIN M: 542
LYMPHOCYTES: 36.6
MCH: 29.6
MCHC: 32.6
MCV: 90.8
MONOCYTES: 8.8
MPV: 10.7
NEUTROPHILS: 49.5
PLATELET COUNT: 269
POTASSIUM: 4.7
PROTEIN, TOTAL: 6.8
RDW: 12.6
RED BLOOD CELL COUNT: 4.33
SODIUM: 139
WHITE BLOOD CELL COUNT: 7.6

## 2025-03-12 ENCOUNTER — WEB ENCOUNTER (OUTPATIENT)
Dept: URBAN - METROPOLITAN AREA CLINIC 86 | Facility: CLINIC | Age: 65
End: 2025-03-12

## 2025-03-12 ENCOUNTER — TELEPHONE ENCOUNTER (OUTPATIENT)
Dept: URBAN - METROPOLITAN AREA CLINIC 86 | Facility: CLINIC | Age: 65
End: 2025-03-12

## 2025-03-12 NOTE — HPI-TODAY'S VISIT:
Wandy Foster, The recent labs were sent to us.  The complete blood count overall normal.  White blood cell 7.6, hemoglobin 12.8, MCV 90 and platelets 269.  Fairly stable when compared to last check.  The immunoglobulin and was 542 and prior 845 and I am happy to see this is lower.  Tells us the immune system is going well for movement.  The creatinine 0.56, sodium 139, potassium 4.7, bilirubin 0.3, alkaline phosphatase 193, AST 24 and ALT 23.  Prior the alkaline phosphatase was 262 and AST 25 and ALT 27.  All of these are lower.  The immunoglobulin G 698.  Things are continuing to improve. Sarah Cruz PA-C

## 2025-03-19 ENCOUNTER — OFFICE VISIT (OUTPATIENT)
Dept: URBAN - METROPOLITAN AREA TELEHEALTH 2 | Facility: TELEHEALTH | Age: 65
End: 2025-03-19
Payer: COMMERCIAL

## 2025-03-19 VITALS — HEIGHT: 64 IN | WEIGHT: 167 LBS | BODY MASS INDEX: 28.51 KG/M2

## 2025-03-19 DIAGNOSIS — R74.8 ELEVATED LIVER ENZYMES: ICD-10-CM

## 2025-03-19 DIAGNOSIS — K76.9 LIVER LESION: ICD-10-CM

## 2025-03-19 DIAGNOSIS — K83.8 COMMON BILE DUCT DILATATION: ICD-10-CM

## 2025-03-19 DIAGNOSIS — K74.3 PRIMARY BILIARY CHOLANGITIS: ICD-10-CM

## 2025-03-19 PROCEDURE — 99214 OFFICE O/P EST MOD 30 MIN: CPT | Performed by: PHYSICIAN ASSISTANT

## 2025-03-19 RX ORDER — URSODIOL 300 MG/1
TAKE 1 BY MOUTH 3 TIMES A DAY CAPSULE ORAL
Qty: 90 CAPSULE | Refills: 2 | Status: ACTIVE | COMMUNITY

## 2025-03-19 RX ORDER — MONTELUKAST SODIUM 10 MG/1
1 TABLET TABLET, FILM COATED ORAL ONCE A DAY
Status: ACTIVE | COMMUNITY

## 2025-03-19 RX ORDER — URSODIOL 300 MG/1
1 CAPSULE CAPSULE ORAL
Qty: 180 CAPSULE | Refills: 1 | OUTPATIENT

## 2025-03-19 NOTE — HPI-TODAY'S VISIT:
This is a 63-year-old female, referred by Dr. Smith, for evaluation of elevated liver enzymes and positive AMA. A copy of the note will be sent to the referring provider.   3/19/25 Dear Wandy Foster,  Recent MRI that you did was sent to me. The abdomen shows a liver that has the hemangioma that is stable. They felt that the spleen and pancreas are normal. They did not see any gallstones. They noted that the lymph nodes that you had previously were stable. The kidneys appear normal. The adrenal glands normal. They did not see any fluid in the abdomen. The hepatic vasculature appeared patent. The common bile duct appeared dilated to about 11 mm and I believe that was 12 prior. They noted some tapering in the size of the distal common bile duct but no filling defects along the course of the bile duct or pancreatic duct. They did not see any branching abnormality. Overall the impression is that the abdominal lymphadenopathy was stable and they noted the common bile duct was distended but they did not think there was a stone. This is somewhat similar to the last report that we had and you already had the EUS. They are recommending an ERCP again and I have reached out to our team that you saw before and is see if they think this is necessary.  Wandy Foster,  The recent labs were sent to us. The complete blood count overall normal. White blood cell 7.6, hemoglobin 12.8, MCV 90 and platelets 269. Fairly stable when compared to last check. The immunoglobulin and was 542 and prior 845 and I am happy to see this is lower. Tells us the immune system is going well for movement. The creatinine 0.56, sodium 139, potassium 4.7, bilirubin 0.3, alkaline phosphatase 193, AST 24 and ALT 23. Prior the alkaline phosphatase was 262 and AST 25 and ALT 27. All of these are lower. The immunoglobulin G 698. Things are continuing to improve.  Sarah Cruz PA-C  has been on ozzy x 3 months  labs responding fairly well  on ozzy tid  dw patient bone density gi did not think she needed another eus/ercp    12/9/24 Dear Wandy Foster,  November 20 CT-guided biopsy was finally released to us. The pathologist apparently had your case also discussed with another specialist report pre releasing this.   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 They found some periportal and patchy bridging fibrosis which would be suspicious for stage III liver fibrosis being present.    In addition they mentioned that in some portal tracts infiltrate was centered around the bile ducts which contain intraepithelial lymphocytes and they show clear evidence of biliary epithelial damage. A rare well-formed portal granuloma was noted and one portal tract showed concentric periductal inflammation.    There was marked ductular proliferation seen and moderate interface hepatitis was seen.  The lobules show foci of lymphocytic inflammatory infiltrate but no steatosis.    Immunohistochemical stains were done confirming the marked bile duct proliferation and loss of bile ducts and some portal tracts. Biliary metaplasia of hepatocytes was also seen.    There was some focal copper deposition seen in the periportal hepatocytes.    They commented that they have reviewed the additional lab records that we sent and that they saw the alk  phosphatase was high at 489 with an AMA of 640 and the MARIA L of 80 and they felt that those were also consistent with a diagnosis of primary biliary cholangitis.    The case was signed out by Dr. Trung Ellis and reviewed also with another liver specialist at [place] .    We need to have you come in so that we can review your current labs, and discuss the therapy of this.    I do not see that you have a pending appointment as I believe this biopsy was delayed.    In preparation for your visit I will ask that you do these updated labs for us so that when Sarah sees you or I would have updated labs are new.   I will ask Cande to call you and get the labs and appt set up.   Her recent labs were on November 20 and the bilirubin 0.4, alkaline phosphatase 49, AST 48 and ALT 62.  INR 0.9.  Platelets 258, hemoglobin 13.1, white blood cell 7.8, red blood cells 4.36  reviewed the above and start rusodiol needs dexa and cholesterol checks  check mri again    recap Dear Wandy Foster,   The recent 8/7/24 labs were sent to me.  The immunoglobulin testing showed that the immunoglobulin M was elevated at 1069, IgG 677 this is normal.  You are not immune to hepatitis B and I recommend discussing that vaccine with your primary care.  Alpha 1 antitrypsin phenotype is MM and this is normal.  The alkaline phosphatase is elevated at 643, AST 55, , bilirubin 0.6.  Goal for the AST and ALT is less than 35.  The white blood cell 7.4, hemoglobin 14.5, MCV 93 and platelets 325.  INR 0.9.  You are immune to hepatitis A.  When I fractionated the alkaline phosphatase that showed that it is mainly coming from the liver and therefore given the labs it does appear that your immune system is active and we  need to see the MRI and pending that likely a biopsy. Have you done the MRI? Please let me know and I will get my medical assistant to get the report.  Sarah Cruz PA-C  Dear Wandy Foster,   The 8/13/24 MRI was sent to me.  The liver without fat or iron.  They saw a small 1.9 x 2 cm area that is consistent with a hemangioma in the left hepatic lobe. These are benign.  They did not see any gallstones they saw mild intrahepatic biliary ductal dilatation and mild dilation of the common bile duct measuring up to 12 mm and I did not see any stone.  Spleen unremarkable.  They saw some several lymph nodes that were enlarged.  They noted that the pancreatic duct was mildly dilated at 4 mm.  Several lymph nodes were mildly enlarged for example gastrohepatic nodes measuring 3.2 x 1.6 cm and periportal nodes 3.5 x 3 cm and aortocaval nodes 1.8 x 1.2 cm.  Overall they saw abnormal retroperitoneal lymphadenopathy they noted that malignancy like lymphoma or metastatic disease could not be excluded they also noted mild intrahepatic and extrahepatic biliary ductal dilatation and mildly dilated pancreatic duct they did not see any stones.  They recommend correlation with ERCP and labs.  May be good to repeat the labs once you are feeling better to see if they are improved. Curious if your illness had an affect on the lymph nodes? Eitherway we will likely need the ERCP. I will go ahead and refer to our team that performs these.  Sarah Cruz PA-C    she was really sick and saw pcp had rocephin and given zpack but feeling better this was right after she saw me and after mri she is scheduled for ercp consult still think given 5 + year of elevation and the labs still being abmormal need the bx. ERCP is not going to give us the diagnosis  recap  PMH includes allergies, asthma, hx hip replacements.  She is listed as taking azelastine 137 mcg, montelukast 10 mg, hydroxyzine 25 mg.  Labs reviewed in July 17 2024 labs with MARIA L positive, iron studies were normal.  Bilirubin 0.5, alkaline phosphatase 442, AST 69, ALT 84.  Immunoglobulin G553 and IgM normal.  .  Ceruloplasmin 43, alpha 1 antitrypsin level 178, ASMA was negative. SHe was able to find labs on shai from 2019 and at that time she had alp 258, ast 35, alt 55 tb 0.5  2020 labs with alp 261, ast 38, alt 39 tb 0.2  2021 alp 335 ast 34 alt 39 0.4  2023 alp 425 ast 40 alt 75  June 2024 labs with alp 502, ast 45, alt 57  CT scan done 9n 8/1/24 showing 1.7 cm enhancing lesion. suspected hemangioma but said to get MRI to be sure  She notees that her liver enzymes have been elevated for some time. She has had this for the past 4-5 yeras. Not sure of the elevation.   She has taken milk thistle. Not currently. She avoids NSAIDS. she said she took marshmellow root She takes a daily MVI and that is all. She used to take calcium.  She takes singulair  hydroxyzine 10 mg  Denies recent illness or illness around the time of the labs and the antibiotics   She has MRI scheduled next week.

## 2025-04-18 ENCOUNTER — WEB ENCOUNTER (OUTPATIENT)
Dept: URBAN - METROPOLITAN AREA CLINIC 86 | Facility: CLINIC | Age: 65
End: 2025-04-18

## 2025-04-19 ENCOUNTER — WEB ENCOUNTER (OUTPATIENT)
Dept: URBAN - METROPOLITAN AREA CLINIC 86 | Facility: CLINIC | Age: 65
End: 2025-04-19

## 2025-05-21 ENCOUNTER — WEB ENCOUNTER (OUTPATIENT)
Dept: URBAN - METROPOLITAN AREA CLINIC 86 | Facility: CLINIC | Age: 65
End: 2025-05-21

## 2025-05-22 ENCOUNTER — WEB ENCOUNTER (OUTPATIENT)
Dept: URBAN - METROPOLITAN AREA CLINIC 86 | Facility: CLINIC | Age: 65
End: 2025-05-22

## 2025-05-27 ENCOUNTER — WEB ENCOUNTER (OUTPATIENT)
Dept: URBAN - METROPOLITAN AREA CLINIC 86 | Facility: CLINIC | Age: 65
End: 2025-05-27

## 2025-06-02 ENCOUNTER — OFFICE VISIT (OUTPATIENT)
Dept: URBAN - METROPOLITAN AREA CLINIC 48 | Facility: CLINIC | Age: 65
End: 2025-06-02
Payer: COMMERCIAL

## 2025-06-02 DIAGNOSIS — K74.3 PRIMARY BILIARY CHOLANGITIS: ICD-10-CM

## 2025-06-02 DIAGNOSIS — Z12.11 COLON CANCER SCREENING: ICD-10-CM

## 2025-06-02 PROCEDURE — 99212 OFFICE O/P EST SF 10 MIN: CPT | Performed by: INTERNAL MEDICINE

## 2025-06-02 PROCEDURE — 99202 OFFICE O/P NEW SF 15 MIN: CPT | Performed by: INTERNAL MEDICINE

## 2025-06-02 RX ORDER — CHOLECALCIFEROL (VITAMIN D3) 50 MCG
1 TABLET TABLET ORAL ONCE A DAY
Status: ACTIVE | COMMUNITY

## 2025-06-02 RX ORDER — MONTELUKAST SODIUM 10 MG/1
1 TABLET TABLET, FILM COATED ORAL ONCE A DAY
Status: ACTIVE | COMMUNITY

## 2025-06-02 RX ORDER — URSODIOL 300 MG/1
TAKE 1 BY MOUTH 3 TIMES A DAY CAPSULE ORAL
Qty: 90 CAPSULE | Refills: 2 | Status: ACTIVE | COMMUNITY

## 2025-06-02 NOTE — HPI-TODAY'S VISIT:
65 y/o female presents for colon cancer screening. She reports her last colonoscopy was done 10+ years ago; denies personal or known family h/o CRC or polyps. She denies changes in bowels, blood in the stool, abdominal pain. She mentions intermittent cough which has been chronic and seems to be related to post-nasal drainage and sinus issues. She denies any typical reflux symptoms. Labs 3/9/25 showed normal CBC and CMP aside from alk phos 193. 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 She is following the [facility] for this. MRI in February non-cirrhotic appearance of the liver.

## 2025-06-18 ENCOUNTER — WEB ENCOUNTER (OUTPATIENT)
Dept: URBAN - METROPOLITAN AREA CLINIC 86 | Facility: CLINIC | Age: 65
End: 2025-06-18

## 2025-06-19 ENCOUNTER — LAB OUTSIDE AN ENCOUNTER (OUTPATIENT)
Dept: URBAN - METROPOLITAN AREA TELEHEALTH 2 | Facility: TELEHEALTH | Age: 65
End: 2025-06-19

## 2025-06-19 ENCOUNTER — WEB ENCOUNTER (OUTPATIENT)
Dept: URBAN - METROPOLITAN AREA CLINIC 86 | Facility: CLINIC | Age: 65
End: 2025-06-19

## 2025-06-19 ENCOUNTER — TELEPHONE ENCOUNTER (OUTPATIENT)
Dept: URBAN - METROPOLITAN AREA CLINIC 86 | Facility: CLINIC | Age: 65
End: 2025-06-19

## 2025-06-20 LAB
A/G RATIO: 1.5
ABSOLUTE BASOPHILS: 64
ABSOLUTE EOSINOPHILS: 313
ABSOLUTE LYMPHOCYTES: 2456
ABSOLUTE MONOCYTES: 736
ABSOLUTE NEUTROPHILS: 5630
ALBUMIN: 3.9
ALKALINE PHOSPHATASE: 189
ALT (SGPT): 27
AST (SGOT): 22
BASOPHILS: 0.7
BILIRUBIN, TOTAL: 0.3
BUN/CREATININE RATIO: (no result)
BUN: 20
CALCIUM: 9.1
CARBON DIOXIDE, TOTAL: 25
CHLORIDE: 105
CREATININE: 0.62
EGFR: 101
EOSINOPHILS: 3.4
GLOBULIN, TOTAL: 2.6
GLUCOSE: 92
HEMATOCRIT: 38.5
HEMOGLOBIN: 13.1
IMMUNOGLOBULIN G, QN, SERUM: 654
INR: 0.9
LYMPHOCYTES: 26.7
MCH: 30.7
MCHC: 34
MCV: 90.2
MONOCYTES: 8
MPV: 10.7
NEUTROPHILS: 61.2
PLATELET COUNT: 265
POTASSIUM: 4.2
PROTEIN, TOTAL: 6.5
PT: 9.9
RDW: 13
RED BLOOD CELL COUNT: 4.27
SODIUM: 140
WHITE BLOOD CELL COUNT: 9.2

## 2025-06-23 ENCOUNTER — WEB ENCOUNTER (OUTPATIENT)
Dept: URBAN - METROPOLITAN AREA CLINIC 86 | Facility: CLINIC | Age: 65
End: 2025-06-23

## 2025-06-23 ENCOUNTER — TELEPHONE ENCOUNTER (OUTPATIENT)
Dept: URBAN - METROPOLITAN AREA CLINIC 86 | Facility: CLINIC | Age: 65
End: 2025-06-23

## 2025-06-23 NOTE — HPI-TODAY'S VISIT:
Dear Wandy Foster,  The recent labs were sent to us.  White blood cells 9.2, hemoglobin 13.1, MCV 90 and platelets normal at 265.  The bilirubin 0.3, alkaline phosphatase was 189, AST 22 and ALT 27.  Prior the alkaline phosphatase was 193.  Still lower.  INR is normal at 0.9.  Immunoglobulin G is normal at 654 which is good to note.  Sarah Cruz PA-C

## 2025-06-24 ENCOUNTER — WEB ENCOUNTER (OUTPATIENT)
Dept: URBAN - METROPOLITAN AREA CLINIC 86 | Facility: CLINIC | Age: 65
End: 2025-06-24

## 2025-07-03 ENCOUNTER — LAB OUTSIDE AN ENCOUNTER (OUTPATIENT)
Dept: URBAN - METROPOLITAN AREA CLINIC 86 | Facility: CLINIC | Age: 65
End: 2025-07-03

## 2025-07-04 ENCOUNTER — WEB ENCOUNTER (OUTPATIENT)
Dept: URBAN - METROPOLITAN AREA CLINIC 86 | Facility: CLINIC | Age: 65
End: 2025-07-04

## 2025-07-04 LAB
ALBUMIN/GLOBULIN RATIO: 2
ALBUMIN: 4.3
ALKALINE PHOSPHATASE: 124
ALT (SGPT): 13
AST (SGOT): 14
BILIRUBIN, DIRECT: 0.1
BILIRUBIN, INDIRECT: 0.5
BILIRUBIN, TOTAL: 0.6
GLOBULIN: 2.2
PROTEIN, TOTAL: 6.5

## 2025-07-07 ENCOUNTER — WEB ENCOUNTER (OUTPATIENT)
Dept: URBAN - METROPOLITAN AREA CLINIC 86 | Facility: CLINIC | Age: 65
End: 2025-07-07

## 2025-07-17 ENCOUNTER — OFFICE VISIT (OUTPATIENT)
Dept: URBAN - METROPOLITAN AREA SURGERY CENTER 27 | Facility: SURGERY CENTER | Age: 65
End: 2025-07-17

## 2025-07-28 ENCOUNTER — WEB ENCOUNTER (OUTPATIENT)
Dept: URBAN - METROPOLITAN AREA CLINIC 86 | Facility: CLINIC | Age: 65
End: 2025-07-28

## 2025-07-29 ENCOUNTER — WEB ENCOUNTER (OUTPATIENT)
Dept: URBAN - METROPOLITAN AREA CLINIC 86 | Facility: CLINIC | Age: 65
End: 2025-07-29

## 2025-07-31 ENCOUNTER — LAB OUTSIDE AN ENCOUNTER (OUTPATIENT)
Dept: URBAN - METROPOLITAN AREA CLINIC 86 | Facility: CLINIC | Age: 65
End: 2025-07-31

## 2025-08-04 ENCOUNTER — LAB OUTSIDE AN ENCOUNTER (OUTPATIENT)
Dept: URBAN - METROPOLITAN AREA TELEHEALTH 2 | Facility: TELEHEALTH | Age: 65
End: 2025-08-04

## 2025-08-15 ENCOUNTER — WEB ENCOUNTER (OUTPATIENT)
Dept: URBAN - METROPOLITAN AREA CLINIC 86 | Facility: CLINIC | Age: 65
End: 2025-08-15

## 2025-08-19 ENCOUNTER — LAB OUTSIDE AN ENCOUNTER (OUTPATIENT)
Dept: URBAN - METROPOLITAN AREA CLINIC 86 | Facility: CLINIC | Age: 65
End: 2025-08-19

## 2025-08-23 LAB
ALBUMIN/GLOBULIN RATIO: 2
ALBUMIN: 4.5
ALKALINE PHOSPHATASE: 149
ALT (SGPT): 15
AST (SGOT): 19
BILIRUBIN, DIRECT: 0.1
BILIRUBIN, INDIRECT: 0.3
BILIRUBIN, TOTAL: 0.4
GLOBULIN: 2.3
PROTEIN, TOTAL: 6.8

## 2025-08-26 ENCOUNTER — TELEPHONE ENCOUNTER (OUTPATIENT)
Dept: URBAN - METROPOLITAN AREA CLINIC 86 | Facility: CLINIC | Age: 65
End: 2025-08-26